# Patient Record
Sex: MALE | Race: BLACK OR AFRICAN AMERICAN | NOT HISPANIC OR LATINO | URBAN - METROPOLITAN AREA
[De-identification: names, ages, dates, MRNs, and addresses within clinical notes are randomized per-mention and may not be internally consistent; named-entity substitution may affect disease eponyms.]

---

## 2021-02-05 ENCOUNTER — EMERGENCY (EMERGENCY)
Facility: HOSPITAL | Age: 56
LOS: 1 days | Discharge: HOME | End: 2021-02-05
Attending: EMERGENCY MEDICINE | Admitting: EMERGENCY MEDICINE
Payer: COMMERCIAL

## 2021-02-05 VITALS
OXYGEN SATURATION: 98 % | TEMPERATURE: 98 F | RESPIRATION RATE: 16 BRPM | SYSTOLIC BLOOD PRESSURE: 143 MMHG | HEART RATE: 83 BPM | DIASTOLIC BLOOD PRESSURE: 86 MMHG

## 2021-02-05 VITALS
SYSTOLIC BLOOD PRESSURE: 129 MMHG | RESPIRATION RATE: 18 BRPM | OXYGEN SATURATION: 96 % | HEART RATE: 78 BPM | TEMPERATURE: 98 F | DIASTOLIC BLOOD PRESSURE: 73 MMHG

## 2021-02-05 DIAGNOSIS — R41.82 ALTERED MENTAL STATUS, UNSPECIFIED: ICD-10-CM

## 2021-02-05 PROCEDURE — G1004: CPT

## 2021-02-05 PROCEDURE — 70450 CT HEAD/BRAIN W/O DYE: CPT | Mod: 26,ME

## 2021-02-05 PROCEDURE — 82962 GLUCOSE BLOOD TEST: CPT

## 2021-02-05 PROCEDURE — 99284 EMERGENCY DEPT VISIT MOD MDM: CPT

## 2021-02-05 PROCEDURE — 70450 CT HEAD/BRAIN W/O DYE: CPT

## 2021-02-05 NOTE — ED PROVIDER NOTE - NSFOLLOWUPINSTRUCTIONS_ED_ALL_ED_FT
WHAT YOU NEED TO KNOW:    Acute delirium is temporary confusion and change in consciousness. Consciousness is how alert and aware of your surroundings you are. You may have trouble remembering, listening, or doing things you usually do. Acute delirium may be caused by an illness, injury, surgery, medicine, or alcohol or drug use.    DISCHARGE INSTRUCTIONS:    Call your local emergency number (911 in the [place]) if:   •You want to harm yourself or others.    Return to the emergency department if:   •You cannot eat or drink, and you feel weak or dizzy.    Call your doctor if:   •You have new or worsening trouble remembering.      •You have new or worsening trouble sleeping.      •You have questions or concerns about your condition or care.      Self-care:   •Talk to counselors. Healthcare providers will work with you to help you feel calm and talk about your thoughts and feelings. They will help you remember where you are. They will work to keep you and those around you safe.      •Talk to family and friends. Talk to those around you when you feel lonely or sad. Ask for help when you forget the time, place, or names of people around you.      •Change your surroundings. Keep your home or room quiet and comfortable. Surround yourself with familiar objects. Keep a calendar and clock nearby to remind you of the date and time. Keep pictures of your family and friends nearby. This will help you stay aware of yourself and the area around you. It may also help you feel safe and calm.      •Take all of your medicines as directed. This will help prevent delirium caused by medicines.      •Write daily schedules and routines. Record medical appointments, times to take your medicines, meal times, or any other things to remember. Write down reminders to use the bathroom if you have trouble remembering.      •Eat healthy foods. Healthy foods will help prevent nutrition problems. Examples are fruits, vegetables, whole-grain breads, low-fat dairy products, beans, lean meats, and fish. Ask if you need to be on a special diet. Your healthcare provider may also recommend vitamins or other supplements if needed. Do not take anything without talking to your provider first.      •Drink liquids as directed. Liquids will help prevent dehydration and constipation.      •Exercise as directed. Exercise such as walking can help improve your mood and ability to think clearly. Exercise can also help you sleep more easily. Your healthcare provider can help you create a safe exercise plan.    Follow up with your healthcare provider as directed: Ask for help if you have a drug or alcohol problem. You may need several appointments to see if your treatment is working. Write down your questions so you remember to ask them during your visits. WHAT YOU NEED TO KNOW:  Acute delirium is temporary confusion and change in consciousness. Consciousness is how alert and aware of your surroundings you are. You may have trouble remembering, listening, or doing things you usually do. Acute delirium may be caused by an illness, injury, surgery, medicine, or alcohol or drug use.  DISCHARGE INSTRUCTIONS:  Call your local emergency number (911 in the [place]) if:   •You want to harm yourself or others.  Return to the emergency department if:   •You cannot eat or drink, and you feel weak or dizzy.  Call your doctor if:   •You have new or worsening trouble remembering.  •You have new or worsening trouble sleeping.  •You have questions or concerns about your condition or care.  Self-care:   •Talk to counselors. Healthcare providers will work with you to help you feel calm and talk about your thoughts and feelings. They will help you remember where you are. They will work to keep you and those around you safe.  •Talk to family and friends. Talk to those around you when you feel lonely or sad. Ask for help when you forget the time, place, or names of people around you.  •Change your surroundings. Keep your home or room quiet and comfortable. Surround yourself with familiar objects. Keep a calendar and clock nearby to remind you of the date and time. Keep pictures of your family and friends nearby. This will help you stay aware of yourself and the area around you. It may also help you feel safe and calm.  •Take all of your medicines as directed. This will help prevent delirium caused by medicines.  •Write daily schedules and routines. Record medical appointments, times to take your medicines, meal times, or any other things to remember. Write down reminders to use the bathroom if you have trouble remembering.  •Eat healthy foods. Healthy foods will help prevent nutrition problems. Examples are fruits, vegetables, whole-grain breads, low-fat dairy products, beans, lean meats, and fish. Ask if you need to be on a special diet. Your healthcare provider may also recommend vitamins or other supplements if needed. Do not take anything without talking to your provider first.  •Drink liquids as directed. Liquids will help prevent dehydration and constipation.  •Exercise as directed. Exercise such as walking can help improve your mood and ability to think clearly. Exercise can also help you sleep more easily. Your healthcare provider can help you create a safe exercise plan.  Follow up with your healthcare provider as directed: Ask for help if you have a drug or alcohol problem. You may need several appointments to see if your treatment is working. Write down your questions so you remember to ask them during your visits.

## 2021-02-05 NOTE — ED PROVIDER NOTE - PROGRESS NOTE DETAILS
pt wakes to touch, however quickly falls back ot sleep. will continue to monitor for clinical sobriety amy: pt received at sign out from dr short as pending sobriety then to be dc'd, 9:45 am pt aaox3, ate sandwich, stable for dc

## 2021-02-05 NOTE — ED ADULT TRIAGE NOTE - CHIEF COMPLAINT QUOTE
pt. was sent from the shelter with c/o intoxication with unknown substance. pt. is responsive to verbal stimuli.

## 2021-02-05 NOTE — ED PROVIDER NOTE - PATIENT PORTAL LINK FT
You can access the FollowMyHealth Patient Portal offered by [facility] by registering at the following website: [email or web address]. By joining [employer]’s FollowMyHealth portal, you will also be able to view your health information using other applications (apps) compatible with our system.

## 2021-02-05 NOTE — ED PROVIDER NOTE - CLINICAL SUMMARY MEDICAL DECISION MAKING FREE TEXT BOX
per shelter pt intoxicated, no evidence of head trauma at this time  -check ct head  -reassess when clinically sober

## 2021-02-15 ENCOUNTER — EMERGENCY (EMERGENCY)
Facility: HOSPITAL | Age: 56
LOS: 1 days | Discharge: HOME | End: 2021-02-15
Attending: EMERGENCY MEDICINE | Admitting: EMERGENCY MEDICINE
Payer: COMMERCIAL

## 2021-02-15 VITALS
HEART RATE: 103 BPM | TEMPERATURE: 99 F | RESPIRATION RATE: 18 BRPM | DIASTOLIC BLOOD PRESSURE: 75 MMHG | SYSTOLIC BLOOD PRESSURE: 116 MMHG | OXYGEN SATURATION: 100 %

## 2021-02-15 DIAGNOSIS — R41.82 ALTERED MENTAL STATUS, UNSPECIFIED: ICD-10-CM

## 2021-02-15 DIAGNOSIS — F11.10 OPIOID ABUSE, UNCOMPLICATED: ICD-10-CM

## 2021-02-15 PROCEDURE — 99284 EMERGENCY DEPT VISIT MOD MDM: CPT | Mod: 25

## 2021-02-15 PROCEDURE — 71045 X-RAY EXAM CHEST 1 VIEW: CPT | Mod: 26

## 2021-02-15 NOTE — ED ADULT NURSE NOTE - OBJECTIVE STATEMENT
Pt found slumped over. Pt has hx of methadone use per EMS. Pt admits to snorting heroin tonight. Pt received 2 rounds of IN Narcan en route. Pt arrives awake, drowsy responds to verbal stimuli. Pt refusing to answer most questions, only mumbling in response and answering a few yes/no questions. Pt asked if he is in pain, pt denies. Pt moving all extremities at this time. Unable to obtain hx d/t pt lethargy.

## 2021-02-15 NOTE — ED ADULT TRIAGE NOTE - ARRIVAL INFO ADDITIONAL COMMENTS
pt found slumped over at shelter and was given 2 doses of intranasal narcan.  pt arrives here moaning to stimuli. pt found slumped over at shelter on [facility] and was given 2 doses of intranasal narcan.  pt arrives here moaning to stimuli.

## 2021-02-15 NOTE — ED ADULT NURSE NOTE - NSIMPLEMENTINTERV_GEN_ALL_ED
Implemented All Fall Risk Interventions:  [place] to call system. Call bell, personal items and telephone within reach. Instruct patient to call for assistance. Room bathroom lighting operational. Non-slip footwear when patient is off stretcher. Physically safe environment: no spills, clutter or unnecessary equipment. Stretcher in lowest position, wheels locked, appropriate side rails in place. Provide visual cue, wrist band, yellow gown, etc. Monitor gait and stability. Monitor for mental status changes and reorient to person, place, and time. Review medications for side effects contributing to fall risk. Reinforce activity limits and safety measures with patient and family.

## 2021-02-15 NOTE — ED ADULT NURSE NOTE - PAIN RATING/NUMBER SCALE (0-10): ACTIVITY
0 Group Topic:  Education    Date: 5/12/2020  Start Time:  1:00 PM  End Time:  1:50 PM  Facilitators: Lolita Gold LPC    Focus: A presentation was given on the characteristics and symptoms of anxiety. Education provided on how avoidance behavior perpetuates anxiety. Discussed effective treatments for anxiety including CBT, Exposure Therapy, relaxation Skills and medication.    Number in attendance: 5          Attendance: Present  Patient Response: Interested in topic and Interactive     Provided good eye contact during the session. Stated that she is open to taking her medications as prescribed and playing video games to manage symptoms of anxiety.    Lolita Gold LPC

## 2021-02-16 VITALS
SYSTOLIC BLOOD PRESSURE: 128 MMHG | RESPIRATION RATE: 17 BRPM | HEART RATE: 86 BPM | OXYGEN SATURATION: 99 % | DIASTOLIC BLOOD PRESSURE: 76 MMHG

## 2021-02-16 LAB
ALBUMIN SERPL ELPH-MCNC: 4.2 G/DL — SIGNIFICANT CHANGE UP (ref 3.3–5)
ALP SERPL-CCNC: 60 U/L — SIGNIFICANT CHANGE UP (ref 40–120)
ALT FLD-CCNC: 65 U/L — HIGH (ref 10–45)
ANION GAP SERPL CALC-SCNC: 13 MMOL/L — SIGNIFICANT CHANGE UP (ref 5–17)
AST SERPL-CCNC: 102 U/L — HIGH (ref 10–40)
BASOPHILS # BLD AUTO: 0.02 K/UL — SIGNIFICANT CHANGE UP (ref 0–0.2)
BASOPHILS NFR BLD AUTO: 0.2 % — SIGNIFICANT CHANGE UP (ref 0–2)
BILIRUB SERPL-MCNC: 0.6 MG/DL — SIGNIFICANT CHANGE UP (ref 0.2–1.2)
BUN SERPL-MCNC: 21 MG/DL — SIGNIFICANT CHANGE UP (ref 7–23)
CALCIUM SERPL-MCNC: 10.3 MG/DL — SIGNIFICANT CHANGE UP (ref 8.4–10.5)
CHLORIDE SERPL-SCNC: 107 MMOL/L — SIGNIFICANT CHANGE UP (ref 96–108)
CO2 SERPL-SCNC: 26 MMOL/L — SIGNIFICANT CHANGE UP (ref 22–31)
CREAT SERPL-MCNC: 1.23 MG/DL — SIGNIFICANT CHANGE UP (ref 0.5–1.3)
EOSINOPHIL # BLD AUTO: 0.01 K/UL — SIGNIFICANT CHANGE UP (ref 0–0.5)
EOSINOPHIL NFR BLD AUTO: 0.1 % — SIGNIFICANT CHANGE UP (ref 0–6)
GLUCOSE SERPL-MCNC: 50 MG/DL — CRITICAL LOW (ref 70–99)
HCT VFR BLD CALC: 43.3 % — SIGNIFICANT CHANGE UP (ref 39–50)
HGB BLD-MCNC: 14.6 G/DL — SIGNIFICANT CHANGE UP (ref 13–17)
IMM GRANULOCYTES NFR BLD AUTO: 0.4 % — SIGNIFICANT CHANGE UP (ref 0–1.5)
LYMPHOCYTES # BLD AUTO: 2.11 K/UL — SIGNIFICANT CHANGE UP (ref 1–3.3)
LYMPHOCYTES # BLD AUTO: 22.6 % — SIGNIFICANT CHANGE UP (ref 13–44)
MCHC RBC-ENTMCNC: 32.1 PG — SIGNIFICANT CHANGE UP (ref 27–34)
MCHC RBC-ENTMCNC: 33.7 GM/DL — SIGNIFICANT CHANGE UP (ref 32–36)
MCV RBC AUTO: 95.2 FL — SIGNIFICANT CHANGE UP (ref 80–100)
MONOCYTES # BLD AUTO: 1.24 K/UL — HIGH (ref 0–0.9)
MONOCYTES NFR BLD AUTO: 13.3 % — SIGNIFICANT CHANGE UP (ref 2–14)
NEUTROPHILS # BLD AUTO: 5.9 K/UL — SIGNIFICANT CHANGE UP (ref 1.8–7.4)
NEUTROPHILS NFR BLD AUTO: 63.4 % — SIGNIFICANT CHANGE UP (ref 43–77)
NRBC # BLD: 0 /100 WBCS — SIGNIFICANT CHANGE UP (ref 0–0)
PLATELET # BLD AUTO: 225 K/UL — SIGNIFICANT CHANGE UP (ref 150–400)
POTASSIUM SERPL-MCNC: 3.4 MMOL/L — LOW (ref 3.5–5.3)
POTASSIUM SERPL-SCNC: 3.4 MMOL/L — LOW (ref 3.5–5.3)
PROT SERPL-MCNC: 8.6 G/DL — HIGH (ref 6–8.3)
RBC # BLD: 4.55 M/UL — SIGNIFICANT CHANGE UP (ref 4.2–5.8)
RBC # FLD: 13.8 % — SIGNIFICANT CHANGE UP (ref 10.3–14.5)
SODIUM SERPL-SCNC: 146 MMOL/L — HIGH (ref 135–145)
WBC # BLD: 9.32 K/UL — SIGNIFICANT CHANGE UP (ref 3.8–10.5)
WBC # FLD AUTO: 9.32 K/UL — SIGNIFICANT CHANGE UP (ref 3.8–10.5)

## 2021-02-16 PROCEDURE — 80053 COMPREHEN METABOLIC PANEL: CPT

## 2021-02-16 PROCEDURE — 96374 THER/PROPH/DIAG INJ IV PUSH: CPT

## 2021-02-16 PROCEDURE — 96361 HYDRATE IV INFUSION ADD-ON: CPT

## 2021-02-16 PROCEDURE — 96375 TX/PRO/DX INJ NEW DRUG ADDON: CPT

## 2021-02-16 PROCEDURE — 70450 CT HEAD/BRAIN W/O DYE: CPT

## 2021-02-16 PROCEDURE — 71045 X-RAY EXAM CHEST 1 VIEW: CPT | Mod: 26

## 2021-02-16 PROCEDURE — 36415 COLL VENOUS BLD VENIPUNCTURE: CPT

## 2021-02-16 PROCEDURE — 72125 CT NECK SPINE W/O DYE: CPT | Mod: 26,MC

## 2021-02-16 PROCEDURE — 71045 X-RAY EXAM CHEST 1 VIEW: CPT

## 2021-02-16 PROCEDURE — 72125 CT NECK SPINE W/O DYE: CPT

## 2021-02-16 PROCEDURE — 85025 COMPLETE CBC W/AUTO DIFF WBC: CPT

## 2021-02-16 PROCEDURE — 82962 GLUCOSE BLOOD TEST: CPT

## 2021-02-16 PROCEDURE — 70450 CT HEAD/BRAIN W/O DYE: CPT | Mod: 26,MC

## 2021-02-16 PROCEDURE — 99284 EMERGENCY DEPT VISIT MOD MDM: CPT | Mod: 25

## 2021-02-16 RX ORDER — SODIUM CHLORIDE 9 MG/ML
1000 INJECTION INTRAMUSCULAR; INTRAVENOUS; SUBCUTANEOUS ONCE
Refills: 0 | Status: COMPLETED | OUTPATIENT
Start: 2021-02-16 | End: 2021-02-16

## 2021-02-16 RX ORDER — NALOXONE HYDROCHLORIDE 4 MG/.1ML
0.4 SPRAY NASAL ONCE
Refills: 0 | Status: COMPLETED | OUTPATIENT
Start: 2021-02-16 | End: 2021-02-16

## 2021-02-16 RX ORDER — DEXTROSE 50 % IN WATER 50 %
25 SYRINGE (ML) INTRAVENOUS ONCE
Refills: 0 | Status: COMPLETED | OUTPATIENT
Start: 2021-02-16 | End: 2021-02-16

## 2021-02-16 RX ADMIN — SODIUM CHLORIDE 1000 MILLILITER(S): 9 INJECTION INTRAMUSCULAR; INTRAVENOUS; SUBCUTANEOUS at 01:52

## 2021-02-16 RX ADMIN — NALOXONE HYDROCHLORIDE 0.4 MILLIGRAM(S): 4 SPRAY NASAL at 00:19

## 2021-02-16 RX ADMIN — SODIUM CHLORIDE 1000 MILLILITER(S): 9 INJECTION INTRAMUSCULAR; INTRAVENOUS; SUBCUTANEOUS at 00:19

## 2021-02-16 RX ADMIN — Medication 25 MILLILITER(S): at 01:13

## 2021-02-16 NOTE — ED PROVIDER NOTE - OBJECTIVE STATEMENT
pmhx heroin abuse prsenting to the ed with AMS. pt answers in grunts. unable to obtain further history from patient. per ems found outside "slumped over". given 2 narcan and became more awake.

## 2021-02-16 NOTE — ED PROVIDER NOTE - NSFOLLOWUPINSTRUCTIONS_ED_ALL_ED_FT
METHAMPHETAMINE ABUSE - AfterCare(R) Instructions(ER/ED)           Methamphetamine Abuse    WHAT YOU NEED TO KNOW:    Methamphetamine (meth) abuse is any use of meth, or needing more meth for the same effects you got from smaller amounts.     DISCHARGE INSTRUCTIONS:    Return to the emergency department if:   •You have chest pain, and your heartbeat or breathing is faster than usual.      •You are so nervous that you cannot function.      •You feel sick or vomit, or have headaches or trouble breathing while being around or cooking meth. You may also feel dizzy.      •Children or others who have been near meth look or act ill, or will not wake up.      •You have a seizure.      •You want to hurt yourself or someone else.      Contact your healthcare provider if:   •You have a fever.      •You are using meth and know or think you may be pregnant.      •You have withdrawal symptoms and want to start using meth again.      •You have questions or concerns about your condition or care.      Medicines:   •Medicines may be given to help you stay calm, reduce depression, or decrease false thoughts.      •Take your medicine as directed. Contact your healthcare provider if you think your medicine is not helping or if you have side effects. Tell him or her if you are allergic to any medicine. Keep a list of the medicines, vitamins, and herbs you take. Include the amounts, and when and why you take them. Bring the list or the pill bottles to follow-up visits. Carry your medicine list with you in case of an emergency.      Long-term effects of meth abuse:   •Memory and concentration problems can make it hard to learn or remember information. You may feel confused. You may also do things more slowly than before.      •Behavior problems may include violent or impulsive actions. Impulsive means you act without thinking first.       •Physical problems include heart weakness or damage. Your heart may have trouble working correctly. Men may have a decreased ability to have sex.      •Self-care problems include not keeping yourself clean and not eating properly because you are focused on using meth. Meth may cause you to look older than you really are.       •Skin problems may happen if you start picking at your skin or do not care for needle marks. You may think you see or feel bugs on or under your skin and try to pick them off. Skin picking causes sores to grow, and the sores can get infected. Meth injection causes needle marks on your skin. Needle marks can also get infected.      •Mouth problems can develop from meth use. Meth can cause dry mouth and make you chew, clench, or grind your teeth more than normal. This causes your teeth to wear down. Your teeth may turn dark or black. They may break, crumble, or fall apart. Your teeth may need to be pulled out.      Dangers of cooking meth: Meth is cooked from chemicals and materials that can cause a fire or explosion. These substances can cause severe burns.    How meth affects unborn or  babies and children:   •If you are pregnant and use meth, your baby may not grow in your womb as he should. He may be born too early or die before birth. Your baby may have problems with his heart, brain, or body development. Do not breastfeed your baby if you use meth. You will give meth to your baby through your breast milk. Ask healthcare providers for more information about treatment programs and drug use while breastfeeding.      •Your child may not grow as he should. He also may have trouble learning or managing anger.      Meth withdrawal: Withdrawal occurs when you decrease or stop using a drug you are addicted to. Meth users may have trouble coping with the symptoms of withdrawal and may start using meth again. Meth withdrawal can cause the following signs and symptoms:   •Seizures      •Feeling sad or wanting to kill yourself      •Strong cravings for meth      •Feeling tired, sleeping longer than usual or not being able sleep at all, or bad dreams      •Trouble focusing on a task, moving more slowly and taking longer to complete tasks, or feeling restless      •Feeling nervous, angry, hungry, or unwell, or thinking people are trying to hurt you      Meth abuse treatment: Most people need therapy and support to stop using meth. Several different kinds of therapy and support are available. Ask your healthcare provider where you can find a therapy or support group.    Follow up with your healthcare provider as directed: Write down your questions so you remember to ask them during your visits.       © Copyright [employer]            back to top                          © Copyright [employer]

## 2021-02-16 NOTE — ED PROVIDER NOTE - CLINICAL SUMMARY MEDICAL DECISION MAKING FREE TEXT BOX
56 year old male pmhx heroin abuse presenting to the ed for ams. unable to obtained.  history. initial evaluation no signs of trauma,. patient states unsure if he hit his head. labs reviewed, ct head/neck negative. pt metabolized in the ed. awake alert and oriented. ambulating with steady gait. endorses meth and heroin use today. pt dc home in stable condition. ED evaluation and management discussed with the patient and family (if available) in detail.  Close PMD follow up encouraged.  Strict ED return instructions discussed in detail and patient given the opportunity to ask any questions about their discharge diagnosis and instructions. Patient verbalized understanding. Patient is agreeable to plan.

## 2021-02-16 NOTE — ED ADULT NURSE REASSESSMENT NOTE - NS ED NURSE REASSESS COMMENT FT1
Pt awake at this time. pt shouting at staff saying he is withdrawing and needs heroin and needs to use the bathroom. Pt reassured he is safe and updated with the POC. Provider aware of pt status. Pt remains in bed, resting comfortably.

## 2021-02-16 NOTE — ED PROVIDER NOTE - ATTENDING CONTRIBUTION TO CARE
55 yo M with PMH of heroin abuse presents to ED with AMS. Per EMS, pt was found outside "slumped over". given 2 narcan and became more awake. Pt admits to heroin use. No signs of trauma,. patient states unsure if he hit his head. labs reviewed, ct head/neck negative. Pt observed in ED until clinically sober. Ambulating steadily in ED. Stable for dc.

## 2021-02-16 NOTE — ED ADULT NURSE REASSESSMENT NOTE - NS ED NURSE REASSESS COMMENT FT1
Pt awake at this time. pt tolerating PO fluids. Pt ambulated with strong and steady gait to bathroom. Pt VSS.

## 2021-02-16 NOTE — ED PROVIDER NOTE - PHYSICAL EXAMINATION
General: Patient is well developed and well nourished. Patient is alert and oriented to person, place and date. Patient is sitting stretcher and appears in no acute distress.  HEENT: Head is normocephalic and atraumatic. Pupils are equal, round and reactive. Extraocular movements intact. No evidence of nystagmus, conjunctival injection, or scleral icterus. External ears symmetric without evidence of discharge.  Nose is symmetric, non-tender, patent without evidence of discharge. Teeth in good repair. Uvula midline.   Neck: Supple with no evidence of lymphadenopathy.  Full range of motion.  Heart: Regular rate and rhythm. No murmurs, rubs or gallops.   Lungs: Clear to auscultation bilaterally with equal chest expansion. No note of wheezes, rhonchi, rales. Equal chest expansion. No note of retractions.  Abdomen: Bowel sounds present in all four quadrants. Soft, non-tender, non-distended without signs of masses, rebound or guarding. No note of hepatosplenomegaly. No CVA tenderness bilaterally. Negative Gil sign or McBurney's.  Musculoskeletal: No edema, erythema, ecchymosis, atrophy or deformity. F No clubbing or cyanosis. No point tenderness to palpation.   Neuro: Cranial nerves II-XII intact. GCS 15. Moving all extremities. Strength is 5/5 arms and legs bilaterally. Sensation intact in extremities. gait steady   Skin: Warm, dry and intact without evidence of rashes, bruising, pallor, jaundice or cyanosis.   Psych: Mood and affect appropriate.

## 2021-02-16 NOTE — ED PROVIDER NOTE - PATIENT PORTAL LINK FT
You can access the FollowMyHealth Patient Portal offered by [facility] by registering at the following website: [email or web address]. By joining [email or web address]’s FollowMyHealth portal, you will also be able to view your health information using other applications (apps) compatible with our system.

## 2024-01-16 NOTE — ED ADULT TRIAGE NOTE - NS ED TRIAGE AVPU SCALE
Painful - The patient does not respond to voice, but does respond to a painful stimulus, such as a squeeze to the hand or sternal rub. A noxious stimulous is needed to elicit a response. Delirium due to multiple etiologies   F05